# Patient Record
Sex: FEMALE | Race: WHITE
[De-identification: names, ages, dates, MRNs, and addresses within clinical notes are randomized per-mention and may not be internally consistent; named-entity substitution may affect disease eponyms.]

---

## 2017-06-16 ENCOUNTER — HOSPITAL ENCOUNTER (EMERGENCY)
Dept: HOSPITAL 45 - C.EDB | Age: 32
Discharge: HOME | End: 2017-06-16
Payer: SELF-PAY

## 2017-06-16 VITALS
HEIGHT: 64.02 IN | BODY MASS INDEX: 21.91 KG/M2 | HEIGHT: 64.02 IN | BODY MASS INDEX: 21.91 KG/M2 | WEIGHT: 128.31 LBS | WEIGHT: 128.31 LBS

## 2017-06-16 VITALS — SYSTOLIC BLOOD PRESSURE: 115 MMHG | OXYGEN SATURATION: 100 % | DIASTOLIC BLOOD PRESSURE: 79 MMHG | HEART RATE: 79 BPM

## 2017-06-16 VITALS — TEMPERATURE: 98.24 F

## 2017-06-16 DIAGNOSIS — Z79.899: ICD-10-CM

## 2017-06-16 DIAGNOSIS — L03.211: Primary | ICD-10-CM

## 2017-06-16 DIAGNOSIS — F17.210: ICD-10-CM

## 2017-06-16 LAB
ANION GAP SERPL CALC-SCNC: 17 MMOL/L (ref 16–25)
ANION GAP SERPL CALC-SCNC: 9 MMOL/L (ref 3–11)
BASOPHILS # BLD: 0.03 K/UL (ref 0–0.2)
BASOPHILS NFR BLD: 0.2 %
BUN SERPL-MCNC: 12 MG/DL (ref 7–18)
BUN/CREAT SERPL: 16.9 (ref 10–20)
CA-I BLD-SCNC: 1.29 MMOL/L (ref 1.12–1.32)
CALCIUM SERPL-MCNC: 9.7 MG/DL (ref 8.5–10.1)
CHLORIDE BLD-SCNC: 103 MEQ/L (ref 101–112)
CHLORIDE SERPL-SCNC: 107 MMOL/L (ref 98–107)
CO2 SERPL-SCNC: 26 MMOL/L (ref 21–32)
COMPLETE: YES
CREAT BLD-MCNC: 0.7 MG/DL (ref 0.6–1.3)
CREAT CL PREDICTED SERPL C-G-VRATE: 96.5 ML/MIN
CREAT SERPL-MCNC: 0.71 MG/DL (ref 0.6–1.2)
EOSINOPHIL NFR BLD AUTO: 246 K/UL (ref 130–400)
GLUCOSE SERPL-MCNC: 95 MG/DL (ref 70–99)
HCT VFR BLD AUTO: 38 % (ref 37–47)
HCT VFR BLD CALC: 38 % (ref 37–47)
HGB BLD-MCNC: 12.9 G/DL (ref 12–16)
IG%: 0.3 %
IMM GRANULOCYTES NFR BLD AUTO: 8.8 %
ISTAT CARBON DIOXIDE: 27 MEQ/L (ref 24–31)
LYMPHOCYTES # BLD: 1.29 K/UL (ref 1.2–3.4)
MCH RBC QN AUTO: 29.1 PG (ref 25–34)
MCHC RBC AUTO-ENTMCNC: 32.4 G/DL (ref 32–36)
MCV RBC AUTO: 90 FL (ref 80–100)
MONOCYTES NFR BLD: 7.3 %
NEUTROPHILS # BLD AUTO: 1 %
NEUTROPHILS NFR BLD AUTO: 82.4 %
PMV BLD AUTO: 10.9 FL (ref 7.4–10.4)
POTASSIUM SERPL-SCNC: 3.6 MMOL/L (ref 3.5–5.1)
RBC # BLD AUTO: 4.22 M/UL (ref 4.2–5.4)
SODIUM BLD-SCNC: 141 MEQ/L (ref 135–144)
SODIUM SERPL-SCNC: 142 MMOL/L (ref 136–145)
WBC # BLD AUTO: 14.58 K/UL (ref 4.8–10.8)

## 2017-06-16 NOTE — DIAGNOSTIC IMAGING REPORT
CT maxillofacial FACIAL-MAXILLOFACIAL WITH



CLINICAL HISTORY: Pt c/o left sided facial swelling pain. Edema.



TECHNIQUE: Transaxial acquisition of multi axial reformatted images.



COMPARISON STUDY:  None



FINDINGS: Considerable left perimandibular and left anterior facial soft tissue

edematous change. This potentially relates to 1 and possibly 2 periapical

abscesses of the left posterior mandibular dentition. No evidence for drainable

abscess or collection. Moderate edematous change of the left masseter

musculature area in several regional reactive nodes.



IMPRESSION:  

1. Left perimandibular soft tissue edema and/or cellulitis.

2. This potentially relates to 1 and possibly 2 periapical abscesses of the left

mid to posterior mandibular dentition.





3. No evidence for drainable abscess or collection. 









Electronically signed by:  Nik Bernstein M.D.

6/16/2017 10:00 AM



Dictated Date/Time:  6/16/2017 9:54 AM

## 2018-02-16 ENCOUNTER — HOSPITAL ENCOUNTER (EMERGENCY)
Dept: HOSPITAL 45 - C.EDB | Age: 33
LOS: 1 days | Discharge: HOME | End: 2018-02-17
Payer: COMMERCIAL

## 2018-02-16 VITALS
BODY MASS INDEX: 21.89 KG/M2 | WEIGHT: 131.4 LBS | HEIGHT: 65 IN | WEIGHT: 131.4 LBS | BODY MASS INDEX: 21.89 KG/M2 | HEIGHT: 65 IN

## 2018-02-16 VITALS — TEMPERATURE: 98.6 F

## 2018-02-16 DIAGNOSIS — O20.8: ICD-10-CM

## 2018-02-16 DIAGNOSIS — O26.851: Primary | ICD-10-CM

## 2018-02-16 DIAGNOSIS — F17.200: ICD-10-CM

## 2018-02-16 LAB
ALBUMIN SERPL-MCNC: 3.6 GM/DL (ref 3.4–5)
ALP SERPL-CCNC: 41 U/L (ref 45–117)
ALT SERPL-CCNC: 16 U/L (ref 12–78)
AST SERPL-CCNC: 7 U/L (ref 15–37)
BASOPHILS # BLD: 0.04 K/UL (ref 0–0.2)
BASOPHILS NFR BLD: 0.4 %
BUN SERPL-MCNC: 11 MG/DL (ref 7–18)
CALCIUM SERPL-MCNC: 9.5 MG/DL (ref 8.5–10.1)
CO2 SERPL-SCNC: 23 MMOL/L (ref 21–32)
CREAT SERPL-MCNC: 0.76 MG/DL (ref 0.6–1.2)
EOS ABS #: 0.19 K/UL (ref 0–0.5)
EOSINOPHIL NFR BLD AUTO: 201 K/UL (ref 130–400)
GLUCOSE SERPL-MCNC: 92 MG/DL (ref 70–99)
HCT VFR BLD CALC: 32.3 % (ref 37–47)
HGB BLD-MCNC: 11 G/DL (ref 12–16)
IG#: 0.03 K/UL (ref 0–0.02)
IMM GRANULOCYTES NFR BLD AUTO: 30.4 %
LYMPHOCYTES # BLD: 2.73 K/UL (ref 1.2–3.4)
MCH RBC QN AUTO: 29.7 PG (ref 25–34)
MCHC RBC AUTO-ENTMCNC: 34.1 G/DL (ref 32–36)
MCV RBC AUTO: 87.3 FL (ref 80–100)
MONO ABS #: 0.8 K/UL (ref 0.11–0.59)
MONOCYTES NFR BLD: 8.9 %
NEUT ABS #: 5.2 K/UL (ref 1.4–6.5)
NEUTROPHILS # BLD AUTO: 2.1 %
NEUTROPHILS NFR BLD AUTO: 57.9 %
PMV BLD AUTO: 11.3 FL (ref 7.4–10.4)
POTASSIUM SERPL-SCNC: 3.4 MMOL/L (ref 3.5–5.1)
PROT SERPL-MCNC: 6.6 GM/DL (ref 6.4–8.2)
RED CELL DISTRIBUTION WIDTH CV: 14.7 % (ref 11.5–14.5)
RED CELL DISTRIBUTION WIDTH SD: 47.2 FL (ref 36.4–46.3)
SODIUM SERPL-SCNC: 138 MMOL/L (ref 136–145)
WBC # BLD AUTO: 8.99 K/UL (ref 4.8–10.8)

## 2018-02-16 NOTE — DIAGNOSTIC IMAGING REPORT
FETAL ULTRASOUND



CLINICAL HISTORY: Vag bleed. 6wk 5day pregnant.     



COMPARISON STUDY:  No previous studies for comparison.



TECHNIQUE: Transabdominal and transvaginal sonography of the pelvis was

performed.



FINDINGS: Intrauterine gestational sac is noted which contains a yolk sac and

fetus with a crown-rump length of 8.9 mm which corresponds to an estimated

gestational age of 6 weeks and 6 days. Fetal cardiac activity is noted with a

normal fetal heart rate of 139 bpm. Note is made of a 2.1 x 0.7 x 1.4 cm

suspected subchorionic hematoma. Neither ovary was visualized. There was no

adnexal mass.







IMPRESSION:  



1. Single viable intrauterine gestation with normal fetal heart rate of 139 bpm.

Estimated gestational age on this exam is 6 weeks and 6 days.



2. Small suspected subchorionic hematoma that measures 2.1 x 0.7 x 1.4 cm.



3. Nonvisualization of the ovaries.









Electronically signed by:  Polo Hopkins M.D.

2/16/2018 11:13 PM



Dictated Date/Time:  2/16/2018 11:11 PM

## 2018-02-17 VITALS — OXYGEN SATURATION: 99 % | DIASTOLIC BLOOD PRESSURE: 57 MMHG | HEART RATE: 78 BPM | SYSTOLIC BLOOD PRESSURE: 100 MMHG

## 2018-02-17 NOTE — EMERGENCY ROOM VISIT NOTE
History


First contact with patient:  21:59


Chief Complaint:  VAGINAL BLEEDING


Stated Complaint:  6 WEEKS PREGNANT, VAG BLEEDING





History of Present Illness


The patient is a 32 year old female who presents to the Emergency Room with 

complaints of vaginal bleeding that started about 7 hours ago.  The patient is 

reportedly pregnant, and states that she had outpatient ultrasound performed by 

[facility] 2 days ago.  She was 6 weeks 5 days based on their 

measurements at that ultrasound with intrauterine pregnancy.  The patient notes 

there was a fetal heart rate at that appointment.  This is her second pregnancy 

with spontaneous  in the first pregnancy.  She considers herself 

usually healthy.  She is having some abdominal cramping but not distinct 

abdominal pain.  She did go through two pads today.  She has not a fever or 

chills.  No distinct pain.  No other complaints.  She rates her discomfort a 2/

10.





Review of Systems


More than 10 systems were reviewed and otherwise negative with the exception of 

history of present illness.





Past Medical/Surgical History


Medical Problems:


(1) History of 


(2) Hx of penicillin allergy








Family History





No significant family history





Social History


Smoking Status:  Current Every Day Smoker


Alcohol Use:  none


Marital Status:  


Housing Status:  lives with family


Occupation Status:  employed





Current/Historical Medications


Scheduled


Prenatal Vit W/ Ferrous Fumara (Prenatal), 1 TAB PO DAILY





Physical Exam


Vital Signs











  Date Time  Temp Pulse Resp B/P (MAP) Pulse Ox O2 Delivery O2 Flow Rate FiO2


 


18 00:47  78 18 100/57 99 Room Air  


 


18 23:19  81 18 111/63 95 Room Air  


 


18 20:55 37.0 87 18 117/72 100 Room Air  











Physical Exam


VITALS: Vitals are noted on the nurse's note and reviewed by myself.  Vital 

signs stable.


GENERAL: Well-developed, well-nourished, white female, who is in no acute 

distress and resting comfortably. Patient is cooperative with the examination.  


NECK: Supple without nuchal rigidity.  No lymphadenopathy.  No thyromegaly.  

Cervical spine is nontender.  


HEART: Regular rate and rhythm without murmurs gallops or rubs.


LUNGS: Clear to auscultation bilaterally without wheezes, rales or rhonchi.  No 

retractions or accessory muscle use.


ABDOMEN: Positive normal bowel sounds x 4.  Soft, nontender, without masses or 

organomegaly.  No guarding or rebound tenderness.


MUSCULOSKELETAL: No muscle atrophy, erythema, or edema noted.  Full range of 

motion without joint tenderness in all extremities.





Medical Decision & Procedures


ER Provider


Diagnostic Interpretation:











FETAL ULTRASOUND





CLINICAL HISTORY: Vag bleed. 6wk 5day pregnant.     





COMPARISON STUDY:  No previous studies for comparison.





TECHNIQUE: Transabdominal and transvaginal sonography of the pelvis was


performed.





FINDINGS: Intrauterine gestational sac is noted which contains a yolk sac and


fetus with a crown-rump length of 8.9 mm which corresponds to an estimated


gestational age of 6 weeks and 6 days. Fetal cardiac activity is noted with a


normal fetal heart rate of 139 bpm. Note is made of a 2.1 x 0.7 x 1.4 cm


suspected subchorionic hematoma. Neither ovary was visualized. There was no


adnexal mass.











IMPRESSION:  





1. Single viable intrauterine gestation with normal fetal heart rate of 139 bpm.


Estimated gestational age on this exam is 6 weeks and 6 days.





2. Small suspected subchorionic hematoma that measures 2.1 x 0.7 x 1.4 cm.





3. Nonvisualization of the ovaries.











Laboratory Results


18 22:20








Red Blood Count 3.70, Mean Corpuscular Volume 87.3, Mean Corpuscular Hemoglobin 

29.7, Mean Corpuscular Hemoglobin Concent 34.1, Mean Platelet Volume 11.3, 

Neutrophils (%) (Auto) 57.9, Lymphocytes (%) (Auto) 30.4, Monocytes (%) (Auto) 

8.9, Eosinophils (%) (Auto) 2.1, Basophils (%) (Auto) 0.4, Neutrophils # (Auto) 

5.20, Lymphocytes # (Auto) 2.73, Monocytes # (Auto) 0.80, Eosinophils # (Auto) 

0.19, Basophils # (Auto) 0.04





18 22:20

















Test


  18


22:20


 


White Blood Count


  8.99 K/uL


(4.8-10.8)


 


Red Blood Count


  3.70 M/uL


(4.2-5.4)


 


Hemoglobin


  11.0 g/dL


(12.0-16.0)


 


Hematocrit 32.3 % (37-47) 


 


Mean Corpuscular Volume


  87.3 fL


()


 


Mean Corpuscular Hemoglobin


  29.7 pg


(25-34)


 


Mean Corpuscular Hemoglobin


Concent 34.1 g/dl


(32-36)


 


Platelet Count


  201 K/uL


(130-400)


 


Mean Platelet Volume


  11.3 fL


(7.4-10.4)


 


Neutrophils (%) (Auto) 57.9 % 


 


Lymphocytes (%) (Auto) 30.4 % 


 


Monocytes (%) (Auto) 8.9 % 


 


Eosinophils (%) (Auto) 2.1 % 


 


Basophils (%) (Auto) 0.4 % 


 


Neutrophils # (Auto)


  5.20 K/uL


(1.4-6.5)


 


Lymphocytes # (Auto)


  2.73 K/uL


(1.2-3.4)


 


Monocytes # (Auto)


  0.80 K/uL


(0.11-0.59)


 


Eosinophils # (Auto)


  0.19 K/uL


(0-0.5)


 


Basophils # (Auto)


  0.04 K/uL


(0-0.2)


 


RDW Standard Deviation


  47.2 fL


(36.4-46.3)


 


RDW Coefficient of Variation


  14.7 %


(11.5-14.5)


 


Immature Granulocyte % (Auto) 0.3 % 


 


Immature Granulocyte # (Auto)


  0.03 K/uL


(0.00-0.02)


 


Anion Gap


  9.0 mmol/L


(3-11)


 


Est Creatinine Clear Calc


Drug Dose 93.9 ml/min 


 


 


Estimated GFR (


American) 118.6 


 


 


Estimated GFR (Non-


American 102.3 


 


 


BUN/Creatinine Ratio 14.8 (10-20) 


 


Calcium Level


  9.5 mg/dl


(8.5-10.1)


 


Total Bilirubin


  0.2 mg/dl


(0.2-1)


 


Aspartate Amino Transf


(AST/SGOT) 7 U/L (15-37) 


 


 


Alanine Aminotransferase


(ALT/SGPT) 16 U/L (12-78) 


 


 


Alkaline Phosphatase


  41 U/L


()


 


Total Protein


  6.6 gm/dl


(6.4-8.2)


 


Albumin


  3.6 gm/dl


(3.4-5.0)


 


Globulin


  3.0 gm/dl


(2.5-4.0)


 


Albumin/Globulin Ratio 1.2 (0.9-2) 


 


Human Chorionic Gonadotropin,


Quant 24627 mIU/mL 


 











ED Course


Physical exam and history were performed. Nursing notes, EMR, and Medication 

List were personally reviewed. 





Patient appears to have vaginal bleeding in the first trimester of pregnancy.  

She states that she has discomfort two pads in the past several hours. She does 

have some abdominal cramping but no distinct abdominal pain.  IV access was 

established and labs were obtained.  Fetal ultrasound was performed.  





The patient's blood work is as above and was reviewed.  She does not have a 

significantly elevated white blood cell count or gross anemia.  Her 

electrolytes are unremarkable.  HCG quantitative does correlate with a roughly 6

+ week pregnancy.  Her ultrasound is as above, and does reveal a viable 

intrauterine pregnancy at 6 weeks 6 days, as well as a small subchorionic 

bleed.  The patient is blood type A positive.  





I discussed results with the patient.  She did not have any worsening of her 

symptoms or significant bleeding after several hours of ER care.  She is felt 

to be stable for discharge home and will need to follow with OB/GYN in the next 

few days for repeat ultrasound.  She will need to be on pelvic rest.  She may 

use Tylenol for pain control.  She was otherwise invited back to the ER if 

symptoms worsened.





The chart was completed utilizing Dragon Speech Voice Recognition Software. 

Grammatical errors, random word insertions, pronoun errors, and incomplete 

sentences are an occasional consequence of this system due to software 

limitations, ambient noise, and hardware issues. Any formal questions or 

concerns about the content, text, or information contained within the body of 

this dictation should be directly addressed to the provider for clarification.


.





Medical Decision


Differential diagnosis:


Etiologies such as ectopic pregnancy, dysfunction uterine bleeding, bleeding 

dyscrasia, trauma, infection, as well as others were entertained.





Impression





 Primary Impression:  


 First trimester bleeding


 Additional Impression:  


 Subchorionic hematoma





Departure Information


Dispostion


Home / Self-Care





Condition


GOOD





Referrals


Nick Arevalo MD





Forms


HOME CARE DOCUMENTATION FORM,                                                 

               IMPORTANT VISIT INFORMATION





Patient Instructions


My [facility]





Additional Instructions





You were seen and evaluated today on an emergency basis only.  This is not a 

substitute for, or an effort to provide, complete comprehensive medical care.  

It is not possible to recognize and treat all injuries or illnesses in a single 

emergency department visit. For this reason it is recommended that you followup 

with OB/GYN next week for recheck of your condition.





Full pelvic rest until cleared by OB/GYN.





Avoid exercise and unnecessary physical exertion until cleared by OB/GYN.





You may take Tylenol 1000 mg every 6-8 hours as needed for pain control.





You are welcome to return to the emergency department anytime with new, 

worsening, or concerning symptoms.





Problem Qualifiers

## 2022-06-03 NOTE — EMERGENCY ROOM VISIT NOTE
A My-Chart message has been sent to the patient for PATIENT ROUNDING.   History


Report prepared by Kirk:  Elizabeth Perez


Under the Supervision of:  Dr. Javy Villegas M.D.


First contact with patient:  08:50


Chief Complaint:  FACIAL PAIN/INJURY


Stated Complaint:  L FACIAL SWELLING DX: ABSCESS





History of Present Illness


The patient is a 32 year old female who presents to the Emergency Room with 

complaints of worsening left-sided facial edema that started yesterday 

afternoon. The patient has been experiencing a left lower tooth ache for the 

last 4 days. She was evaluated at Urgent Care on Tuesday and started on 

Clindamycin. The patient went to the dentist 3 days ago and they told her that 

they are going to pull her tooth on Tuesday. Her dentist told her to come into 

the ED if the facial edema got worse so that is why she came in today. She is 

able to swallow without difficulty. The patient states that she also had a 

fever 3 days ago. The patient has been taking a NSAID prescribed by Urgent Care 

and ibuprofen, but she has experienced minimal relief of her pain.





   Source of History:  patient


   Onset:  yesterday afternoon


   Position:  jaw (left side)


   Quality:  other (left-sided facial edema)


   Timing:  worsening


   Associated Symptoms:  + fevers


Note:


left lower tooth ache, able to swallow without difficulty





Review of Systems


See HPI for pertinent positives & negatives. A total of 10 systems reviewed and 

were otherwise negative.





Past Medical & Surgical


Medical Problems:


(1) History of 


(2) Hx of penicillin allergy








Family History





No significant family history





Social History


Smoking Status:  Current Some Day Smoker


Alcohol Use:  none


Marital Status:  


Housing Status:  lives with family


Occupation Status:  employed





Current/Historical Medications


Scheduled


Cephalexin Monohydrate (Keflex), 1 CAP PO QID


Clindamycin Hcl (Cleocin), 300 MG PO Q8


Sertraline HCl (Sertraline HCl), 150 MG PO DAILY


Sulfa/Trimethoprim (Bactrim Ds 800MG/160MG), 1 TAB PO BID





Scheduled PRN


Oxycodone/Acetaminophen 5MG/325MG (Percocet 5MG/325MG), 1-2 TAB PO Q4H PRN for 

Pain





Allergies


Coded Allergies:  


     Penicillins (Verified  Allergy, Unknown, HAD RXN WHEN SMALL CHILD, 17)





Physical Exam


Vital Signs











  Date Time  Temp Pulse Resp B/P (MAP) Pulse Ox O2 Delivery O2 Flow Rate FiO2


 


6/16/17 10:40  79 16 115/79 100 Room Air  


 


17 10:36   16     


 


17 08:36 36.8 80 18 125/73 99 Room Air  











Physical Exam


GENERAL: Patient is a healthy-appearing well-nourished female.


HEAD: Normocephalic atraumatic left side of face is grossly swollen along the 

left angle of mandible, no evidence of Koffi's angina on exam


EYES: Ocular movements intact pupils equal and react to light


OROPHARYNX mucous membranes are moist mucosal tissues under tongue are soft 

inferior region of mandible is soft and nontender no exudates present no 

erythema or edema present


NECK: Supple no nuchal rigidity


CHEST: Good equal expansion


LUNGS: Clear and equal to auscultation


CARDIAC: Normal S1 and S2


ABDOMEN: Soft nontender no guarding


BACK: No CVA tenderness


EXTREMITIES: No pain upon palpation normal muscle strength in all groups no 

clubbing cyanosis or edema


NEURO: Patient is following commands and answering questions appropriately. 

Alert and oriented x3 Cranial Nerves 2-12 grossly intact





Medical Decision & Procedures


ER Provider


Diagnostic Interpretation:


CT results as stated below per my review and radiologist interpretation: 





CT maxillofacial FACIAL-MAXILLOFACIAL WITH





FINDINGS: Considerable left perimandibular and left anterior facial soft tissue


edematous change. This potentially relates to 1 and possibly 2 periapical


abscesses of the left posterior mandibular dentition. No evidence for drainable


abscess or collection. Moderate edematous change of the left masseter


musculature area in several regional reactive nodes.





IMPRESSION:  


1. Left perimandibular soft tissue edema and/or cellulitis.


2. This potentially relates to 1 and possibly 2 periapical abscesses of the left


mid to posterior mandibular dentition.


3. No evidence for drainable abscess or collection. 





Electronically signed by:  Nik Bernstein M.D.


2017 10:00 AM





Dictated Date/Time:  2017 9:54 AM





Laboratory Results


17 09:25








Red Blood Count 4.22, Mean Corpuscular Volume 90.0, Mean Corpuscular Hemoglobin 

29.1, Mean Corpuscular Hemoglobin Concent 32.4, Mean Platelet Volume 10.9, 

Neutrophils (%) (Auto) 82.4, Lymphocytes (%) (Auto) 8.8, Monocytes (%) (Auto) 

7.3, Eosinophils (%) (Auto) 1.0, Basophils (%) (Auto) 0.2, Neutrophils # (Auto) 

12.02, Lymphocytes # (Auto) 1.29, Monocytes # (Auto) 1.06, Eosinophils # (Auto) 

0.14, Basophils # (Auto) 0.03





17 09:25

















Test


  17


09:25 17


09:29


 


White Blood Count


  14.58 K/uL


(4.8-10.8) 


 


 


Red Blood Count


  4.22 M/uL


(4.2-5.4) 


 


 


Hemoglobin


  12.3 g/dL


(12.0-16.0) 


 


 


Hematocrit 38.0 % (37-47)  


 


Mean Corpuscular Volume


  90.0 fL


() 


 


 


Mean Corpuscular Hemoglobin


  29.1 pg


(25-34) 


 


 


Mean Corpuscular Hemoglobin


Concent 32.4 g/dl


(32-36) 


 


 


Platelet Count


  246 K/uL


(130-400) 


 


 


Mean Platelet Volume


  10.9 fL


(7.4-10.4) 


 


 


Neutrophils (%) (Auto) 82.4 %  


 


Lymphocytes (%) (Auto) 8.8 %  


 


Monocytes (%) (Auto) 7.3 %  


 


Eosinophils (%) (Auto) 1.0 %  


 


Basophils (%) (Auto) 0.2 %  


 


Neutrophils # (Auto)


  12.02 K/uL


(1.4-6.5) 


 


 


Lymphocytes # (Auto)


  1.29 K/uL


(1.2-3.4) 


 


 


Monocytes # (Auto)


  1.06 K/uL


(0.11-0.59) 


 


 


Eosinophils # (Auto)


  0.14 K/uL


(0-0.5) 


 


 


Basophils # (Auto)


  0.03 K/uL


(0-0.2) 


 


 


RDW Standard Deviation


  48.3 fL


(36.4-46.3) 


 


 


RDW Coefficient of Variation


  14.7 %


(11.5-14.5) 


 


 


Immature Granulocyte % (Auto) 0.3 %  


 


Immature Granulocyte # (Auto)


  0.04 K/uL


(0.00-0.02) 


 


 


Est Creatinine Clear Calc


Drug Dose 96.5 ml/min 


  


 


 


Estimated GFR (


American) 128.8 


  


 


 


Estimated GFR (Non-


American 111.1 


  


 


 


BUN/Creatinine Ratio 16.9 (10-20)  


 


Calcium Level


  9.7 mg/dl


(8.5-10.1) 


 


 


Bedside Hemoglobin


  


  12.9 g/dl


(12.0-16.0)


 


Bedside Hematocrit  38 % (37-47) 


 


Bedside Sodium


  


  141 mEq/L


(135-144)


 


Bedside Potassium


  


  3.6 mEq/L


(3.3-5.0)


 


Bedside Chloride


  


  103 mEq/L


(101-112)


 


Bedside Total CO2


  


  27 mEq/l


(24-31)


 


Anion Gap


  


  17.0 mmol/L


(16-25)


 


Bedside Blood Urea Nitrogen


  


  12 mg/dl


(7-18)


 


Bedside Creatinine


  


  0.7 mg/dl


(0.6-1.3)


 


Bedside Glucose (other)


  


  94 mg/dl


(70-99)


 


Bedside Ionized Calcium (Chito)


  


  1.29 mmol/l


(1.12-1.32)











Labs reviewed by ED physician.





Medications Administered











 Medications


  (Trade)  Dose


 Ordered  Sig/Arcadio


 Route  Start Time


 Stop Time Status Last Admin


Dose Admin


 


 Sodium Chloride  1,000 ml @ 


 999 mls/hr  Q1H1M STAT


 IV  17 09:04


 17 10:04 DC 17 09:36


999 MLS/HR


 


 Ceftriaxone Sodium


  (Rocephin Inj)  1 gm  NOW  STAT


 IV  17 09:04


 17 09:08 DC 17 09:36


1 GM


 


 Trimethoprim/


 Sulfamethoxazole


  (Septra Ds 800/


 160MG Tab)  1 tab  NOW  STAT


 PO  17 09:04


 17 09:08 DC 17 09:38


1 TAB


 


 Hydromorphone HCl


  (Dilaudid Inj)  1 mg  NOW  STAT


 IV  17 09:41


 17 09:44 DC 17 10:01


1 MG


 


 Ketorolac


 Tromethamine


  (Toradol Inj)  30 mg  NOW  STAT


 IV  17 09:41


 17 09:44 DC 17 10:01


30 MG


 


 Ondansetron HCl


  (Zofran Inj)  4 mg  NOW  STAT


 IV  17 09:41


 17 09:44 DC 17 10:02


4 MG











ED Course


0859: Past medical records reviewed. The patient was evaluated in room B11. A 

complete history and physical examination was performed. 





0904: Ordered Trimethoprim/Sulfamethoxazole 1 tap PO, Rocephin Inj 1 gm IV, 

Sodium Chloride 1000 ml @ 999 mls/hr IV





0941: Ordered Zofran Inj 4 mg IV, Toradol Inj 30 mg IV, Dilaudid Inj 1 mg IV





1035: Upon reexamination the patient is doing well. I discussed results and 

treatment plan with the patient. She verbalizes agreement and understanding. 

The patient is ready for discharge.





Medical Decision


Differential diagnosis:


Etiologies such as cellulitis, abscess, MRSA infection, DVT, necrotizing 

fasciitis, dermatitis, drug eruption, as well as others were entertained.





Medication Reconciliation: I attest that I have personally reviewed the patient'

s current medication list


Blood Pressure Screening: Patient was found to have an elevated blood pressure 

and was referred to their primary care doctor for recheck and further treatment





This is a 32-year-old female who presents emergency department complaining of 

left angle of the jaw swelling.  The patient has no evidence of Koffi's angina 

on examination.  She has no evidence of meningitis encephalitis on examination.

  The patient was started on IV Rocephin and was also started on Bactrim.  

Repeat examination revealed improvement patient's symptoms.  The patient was 

also given normal saline bolus, Toradol, Dilaudid.  I do believe that the 

patient is well enough to be discharged home.  I will continue her on Keflex as 

well as Bactrim and encouraged her to follow-up with her dentist.  Patient was 

in agreement with the treatment plan.





Impression





 Primary Impression:  


 Cellulitis





Scribe Attestation


The scribe's documentation has been prepared under my direction and personally 

reviewed by me in its entirety. I confirm that the note above accurately 

reflects all work, treatment, procedures, and medical decision making performed 

by me.





Departure Information


Dispostion


Home / Self-Care





Prescriptions





Oxycodone/Acetaminophen 5MG/325MG (PERCOCET 5MG/325MG)  Tab


1-2 TAB PO Q4H Y for Pain, #14 TAB


   Prov: Javy Villegas MD         17 


Sulfa/Trimethoprim (Bactrim Ds 800MG/160MG)  Tab


1 TAB PO BID for 10 Days, #20 TAB


   Prov: Javy Villegas MD         17 


Cephalexin Monohydrate (Keflex) 500 Mg Cap


1 CAP PO QID for 10 Days, #40 CAP


   Prov: Javy Villegas MD         17





Referrals


Shola Daniel M.D. (PCP)





Forms


HOME CARE DOCUMENTATION FORM,                                                 

               IMPORTANT VISIT INFORMATION





Patient Instructions


ED Dental Abscess Facial Cellulitis, My [facility]





Additional Instructions





You were found to have an elevated blood pressure today (>120 sytolic or >90 

diastolic). Per medicare guidelines, you need to follow up with this blood 

pressure screening with your Primary Care Physician (PCP). For a new PCP call 

[phone].   





You received narcotic or benzodiazepene medication while in the emergency room 

today.  Do not drive, operate heavy machinery, or drink alcohol under the 

influence of this medication.  





Take 600 mg Ibuprofen every 6 hours


Take Percocet for breakthrough pain











You have been examined and treated today on an emergency basis only. This is 

not a substitute for, or an effort to provide, complete comprehensive medical 

care. It is impossible to recognize and treat all injuries or illnesses in a 

single emergency department visit. It is therefore important that you follow up 

closely with Dr Sánchez.  Call as soon as possible for an appointment.  





Thank you for your time and consideration.  I look forward to speaking with you 

again soon.  Please don't hesitate to call us if you have any questions.





Problem Qualifiers








 Primary Impression:  


 Cellulitis


 Site of cellulitis:  face  Qualified Codes:  L03.211 - Cellulitis of face